# Patient Record
Sex: MALE
[De-identification: names, ages, dates, MRNs, and addresses within clinical notes are randomized per-mention and may not be internally consistent; named-entity substitution may affect disease eponyms.]

---

## 2022-11-28 ENCOUNTER — APPOINTMENT (OUTPATIENT)
Dept: ORTHOPEDIC SURGERY | Facility: CLINIC | Age: 37
End: 2022-11-28

## 2022-11-28 DIAGNOSIS — S33.5XXA SPRAIN OF LIGAMENTS OF LUMBAR SPINE, INITIAL ENCOUNTER: ICD-10-CM

## 2022-11-28 PROBLEM — Z00.00 ENCOUNTER FOR PREVENTIVE HEALTH EXAMINATION: Status: ACTIVE | Noted: 2022-11-28

## 2022-11-28 PROCEDURE — 99203 OFFICE O/P NEW LOW 30 MIN: CPT

## 2022-11-28 PROCEDURE — 72100 X-RAY EXAM L-S SPINE 2/3 VWS: CPT

## 2022-11-28 RX ORDER — METHYLPREDNISOLONE 4 MG/1
4 TABLET ORAL
Qty: 1 | Refills: 1 | Status: ACTIVE | COMMUNITY
Start: 2022-11-28 | End: 1900-01-01

## 2022-11-28 NOTE — PHYSICAL EXAM
[Normal Mood and Affect] : normal mood and affect [Able to Communicate] : able to communicate [Well Developed] : well developed [Well Nourished] : well nourished [NL (30)] : right lateral bending 30 degrees [Flexion] : flexion [Bending to left] : bending to left [Bending to right] : bending to right [No bony abnormalities] : No bony abnormalities [] : non-antalgic [de-identified] : Patella reflexes hyporeflexive equally [TWNoteComboBox7] : forward flexion 75 degrees

## 2022-11-28 NOTE — HISTORY OF PRESENT ILLNESS
[Lower back] : lower back [Gradual] : gradual [Sudden] : sudden [7] : 7 [6] : 6 [Burning] : burning [Shooting] : shooting [Stabbing] : stabbing [Intermittent] : intermittent [Rest] : rest [Exercising] : exercising [Full time] : Work status: full time [de-identified] : 11/28/22: Initial visit for this 37 year male here today for an injury to his lower back three weeks ago when he was doing dead-lifts and developed acute lower back pain that was severe.  He went to an urgent care and was sent home on Flexeril, a MDP and naproxen.  His pain did improve initially until six days ago when he had to run to his daughter quickly and he reinjured his back.  All pain in his back and without leg pain.  Sitting for longer periods and standing cause pain. Not taking any NSAIDs.    Pain scale currently a 3.  Using heat and ice.  \par \par PMH:  Previous back pain about 7 years ago doing the same exercises, with pain radiating down the side of his legs. Also treated with PT. He did stop dead-lifting for a while after this.  [] : Post Surgical Visit: no [FreeTextEntry1] : l spine  [FreeTextEntry5] : Pt initially hurt his back while deadlifting, pt was seen at an urgent care and was given muscle relaxers and anti inflammitory meds with some relief, pt then twisted the other day and had a sudden onset of the same lower back pain\par \par pt has a hx of another lower back injury where he had shooting pain down his left side but pt is having a sharp pain that radiates into his hips but does not shoot down   [de-identified] : none  [de-identified] :